# Patient Record
Sex: MALE | Race: WHITE | NOT HISPANIC OR LATINO | Employment: OTHER | URBAN - METROPOLITAN AREA
[De-identification: names, ages, dates, MRNs, and addresses within clinical notes are randomized per-mention and may not be internally consistent; named-entity substitution may affect disease eponyms.]

---

## 2021-07-01 ENCOUNTER — NURSE TRIAGE (OUTPATIENT)
Dept: HEALTH INFORMATION MANAGEMENT | Facility: OTHER | Age: 71
End: 2021-07-01

## 2021-07-01 NOTE — TELEPHONE ENCOUNTER
[address] [place] [address]    CHUY Wellington    Pt is in [place].    Pt has questions in regards to Covid/isolation times. Pt was informed of information on the CDC website, was not given advise d/t not having a [place] liscence, was able to answer questions in reference to CDC.gov.

## 2023-02-06 ENCOUNTER — HOSPITAL ENCOUNTER (OUTPATIENT)
Facility: MEDICAL CENTER | Age: 73
End: 2023-02-07
Attending: HOSPITALIST | Admitting: HOSPITALIST
Payer: MEDICARE

## 2023-02-06 ENCOUNTER — APPOINTMENT (OUTPATIENT)
Dept: CARDIOLOGY | Facility: MEDICAL CENTER | Age: 73
End: 2023-02-06
Attending: INTERNAL MEDICINE
Payer: MEDICARE

## 2023-02-06 DIAGNOSIS — J44.1 COPD EXACERBATION (HCC): ICD-10-CM

## 2023-02-06 PROBLEM — R07.9 CHEST PAIN: Status: ACTIVE | Noted: 2023-02-06

## 2023-02-06 PROBLEM — E78.5 HLD (HYPERLIPIDEMIA): Status: ACTIVE | Noted: 2023-02-06

## 2023-02-06 PROBLEM — I16.0 HYPERTENSIVE URGENCY: Status: ACTIVE | Noted: 2023-02-06

## 2023-02-06 LAB
EKG IMPRESSION: NORMAL
EKG IMPRESSION: NORMAL
EST. AVERAGE GLUCOSE BLD GHB EST-MCNC: 97 MG/DL
HBA1C MFR BLD: 5 % (ref 4–5.6)
LV EJECT FRACT  99904: 60
LV EJECT FRACT MOD 2C 99903: 55.91
LV EJECT FRACT MOD 4C 99902: 60.97
LV EJECT FRACT MOD BP 99901: 55.92
TROPONIN T SERPL-MCNC: 17 NG/L (ref 6–19)
TROPONIN T SERPL-MCNC: 21 NG/L (ref 6–19)
TROPONIN T SERPL-MCNC: 24 NG/L (ref 6–19)
TSH SERPL DL<=0.005 MIU/L-ACNC: 0.95 UIU/ML (ref 0.38–5.33)

## 2023-02-06 PROCEDURE — 83036 HEMOGLOBIN GLYCOSYLATED A1C: CPT

## 2023-02-06 PROCEDURE — 93005 ELECTROCARDIOGRAM TRACING: CPT | Performed by: INTERNAL MEDICINE

## 2023-02-06 PROCEDURE — 99223 1ST HOSP IP/OBS HIGH 75: CPT | Performed by: INTERNAL MEDICINE

## 2023-02-06 PROCEDURE — 700111 HCHG RX REV CODE 636 W/ 250 OVERRIDE (IP): Performed by: INTERNAL MEDICINE

## 2023-02-06 PROCEDURE — G0378 HOSPITAL OBSERVATION PER HR: HCPCS

## 2023-02-06 PROCEDURE — A9270 NON-COVERED ITEM OR SERVICE: HCPCS | Performed by: INTERNAL MEDICINE

## 2023-02-06 PROCEDURE — 94664 DEMO&/EVAL PT USE INHALER: CPT

## 2023-02-06 PROCEDURE — 93306 TTE W/DOPPLER COMPLETE: CPT

## 2023-02-06 PROCEDURE — 96372 THER/PROPH/DIAG INJ SC/IM: CPT

## 2023-02-06 PROCEDURE — 94669 MECHANICAL CHEST WALL OSCILL: CPT

## 2023-02-06 PROCEDURE — 700102 HCHG RX REV CODE 250 W/ 637 OVERRIDE(OP): Performed by: INTERNAL MEDICINE

## 2023-02-06 PROCEDURE — 94640 AIRWAY INHALATION TREATMENT: CPT

## 2023-02-06 PROCEDURE — 84443 ASSAY THYROID STIM HORMONE: CPT

## 2023-02-06 PROCEDURE — 93306 TTE W/DOPPLER COMPLETE: CPT | Mod: 26 | Performed by: INTERNAL MEDICINE

## 2023-02-06 PROCEDURE — 700101 HCHG RX REV CODE 250: Performed by: INTERNAL MEDICINE

## 2023-02-06 PROCEDURE — 84484 ASSAY OF TROPONIN QUANT: CPT

## 2023-02-06 PROCEDURE — 93010 ELECTROCARDIOGRAM REPORT: CPT | Performed by: INTERNAL MEDICINE

## 2023-02-06 RX ORDER — ENOXAPARIN SODIUM 100 MG/ML
40 INJECTION SUBCUTANEOUS DAILY
Status: DISCONTINUED | OUTPATIENT
Start: 2023-02-06 | End: 2023-02-07 | Stop reason: HOSPADM

## 2023-02-06 RX ORDER — BUDESONIDE AND FORMOTEROL FUMARATE DIHYDRATE 160; 4.5 UG/1; UG/1
2 AEROSOL RESPIRATORY (INHALATION)
Status: DISCONTINUED | OUTPATIENT
Start: 2023-02-06 | End: 2023-02-07 | Stop reason: HOSPADM

## 2023-02-06 RX ORDER — IPRATROPIUM BROMIDE AND ALBUTEROL SULFATE 2.5; .5 MG/3ML; MG/3ML
3 SOLUTION RESPIRATORY (INHALATION)
Status: DISCONTINUED | OUTPATIENT
Start: 2023-02-06 | End: 2023-02-07 | Stop reason: HOSPADM

## 2023-02-06 RX ORDER — ACETAMINOPHEN 325 MG/1
650 TABLET ORAL EVERY 6 HOURS PRN
Status: DISCONTINUED | OUTPATIENT
Start: 2023-02-06 | End: 2023-02-07 | Stop reason: HOSPADM

## 2023-02-06 RX ORDER — AMOXICILLIN 250 MG
2 CAPSULE ORAL 2 TIMES DAILY
Status: DISCONTINUED | OUTPATIENT
Start: 2023-02-06 | End: 2023-02-07 | Stop reason: HOSPADM

## 2023-02-06 RX ORDER — ONDANSETRON 2 MG/ML
4 INJECTION INTRAMUSCULAR; INTRAVENOUS EVERY 4 HOURS PRN
Status: DISCONTINUED | OUTPATIENT
Start: 2023-02-06 | End: 2023-02-07 | Stop reason: HOSPADM

## 2023-02-06 RX ORDER — FLUTICASONE FUROATE AND VILANTEROL 200; 25 UG/1; UG/1
1 POWDER RESPIRATORY (INHALATION) DAILY
COMMUNITY

## 2023-02-06 RX ORDER — HYDRALAZINE HYDROCHLORIDE 20 MG/ML
10 INJECTION INTRAMUSCULAR; INTRAVENOUS EVERY 4 HOURS PRN
Status: DISCONTINUED | OUTPATIENT
Start: 2023-02-06 | End: 2023-02-07 | Stop reason: HOSPADM

## 2023-02-06 RX ORDER — NITROGLYCERIN 0.4 MG/1
0.4 TABLET SUBLINGUAL
Status: DISCONTINUED | OUTPATIENT
Start: 2023-02-06 | End: 2023-02-07 | Stop reason: HOSPADM

## 2023-02-06 RX ORDER — PREDNISONE 20 MG/1
40 TABLET ORAL DAILY
Status: DISCONTINUED | OUTPATIENT
Start: 2023-02-06 | End: 2023-02-07 | Stop reason: HOSPADM

## 2023-02-06 RX ORDER — ATORVASTATIN CALCIUM 40 MG/1
40 TABLET, FILM COATED ORAL NIGHTLY
Status: ON HOLD | COMMUNITY
End: 2023-02-07

## 2023-02-06 RX ORDER — POLYETHYLENE GLYCOL 3350 17 G/17G
1 POWDER, FOR SOLUTION ORAL
Status: DISCONTINUED | OUTPATIENT
Start: 2023-02-06 | End: 2023-02-07 | Stop reason: HOSPADM

## 2023-02-06 RX ORDER — BISACODYL 10 MG
10 SUPPOSITORY, RECTAL RECTAL
Status: DISCONTINUED | OUTPATIENT
Start: 2023-02-06 | End: 2023-02-07 | Stop reason: HOSPADM

## 2023-02-06 RX ORDER — ONDANSETRON 4 MG/1
4 TABLET, ORALLY DISINTEGRATING ORAL EVERY 4 HOURS PRN
Status: DISCONTINUED | OUTPATIENT
Start: 2023-02-06 | End: 2023-02-07 | Stop reason: HOSPADM

## 2023-02-06 RX ADMIN — ENOXAPARIN SODIUM 40 MG: 100 INJECTION SUBCUTANEOUS at 18:37

## 2023-02-06 RX ADMIN — IPRATROPIUM BROMIDE AND ALBUTEROL SULFATE 3 ML: .5; 2.5 SOLUTION RESPIRATORY (INHALATION) at 22:08

## 2023-02-06 RX ADMIN — BUDESONIDE AND FORMOTEROL FUMARATE DIHYDRATE 2 PUFF: 160; 4.5 AEROSOL RESPIRATORY (INHALATION) at 12:27

## 2023-02-06 RX ADMIN — IPRATROPIUM BROMIDE AND ALBUTEROL SULFATE 3 ML: .5; 2.5 SOLUTION RESPIRATORY (INHALATION) at 17:10

## 2023-02-06 RX ADMIN — BUDESONIDE AND FORMOTEROL FUMARATE DIHYDRATE 2 PUFF: 160; 4.5 AEROSOL RESPIRATORY (INHALATION) at 18:53

## 2023-02-06 RX ADMIN — PREDNISONE 40 MG: 20 TABLET ORAL at 16:28

## 2023-02-06 ASSESSMENT — LIFESTYLE VARIABLES
ALCOHOL_USE: YES
TOTAL SCORE: 0
TOTAL SCORE: 0
HAVE PEOPLE ANNOYED YOU BY CRITICIZING YOUR DRINKING: NO
EVER_SMOKED: YES
HAVE PEOPLE ANNOYED YOU BY CRITICIZING YOUR DRINKING: NO
ALCOHOL_USE: YES
DOES PATIENT WANT TO STOP DRINKING: NO
TOTAL SCORE: 0
DOES PATIENT WANT TO STOP DRINKING: NO
EVER FELT BAD OR GUILTY ABOUT YOUR DRINKING: NO
TOTAL SCORE: 0
HAVE YOU EVER FELT YOU SHOULD CUT DOWN ON YOUR DRINKING: NO
ON A TYPICAL DAY WHEN YOU DRINK ALCOHOL HOW MANY DRINKS DO YOU HAVE: 3
EVER HAD A DRINK FIRST THING IN THE MORNING TO STEADY YOUR NERVES TO GET RID OF A HANGOVER: NO
HAVE YOU EVER FELT YOU SHOULD CUT DOWN ON YOUR DRINKING: NO
TOTAL SCORE: 0
AVERAGE NUMBER OF DAYS PER WEEK YOU HAVE A DRINK CONTAINING ALCOHOL: 20
EVER FELT BAD OR GUILTY ABOUT YOUR DRINKING: NO
TOTAL SCORE: 0
EVER HAD A DRINK FIRST THING IN THE MORNING TO STEADY YOUR NERVES TO GET RID OF A HANGOVER: NO
CONSUMPTION TOTAL: POSITIVE
HOW MANY TIMES IN THE PAST YEAR HAVE YOU HAD 5 OR MORE DRINKS IN A DAY: 100
CONSUMPTION TOTAL: INCOMPLETE

## 2023-02-06 ASSESSMENT — ENCOUNTER SYMPTOMS
SPUTUM PRODUCTION: 0
BLOOD IN STOOL: 0
DIAPHORESIS: 0
SHORTNESS OF BREATH: 1
BLURRED VISION: 0
COUGH: 0
BRUISES/BLEEDS EASILY: 0
NAUSEA: 0
CHILLS: 0
DIZZINESS: 0
ABDOMINAL PAIN: 0
FLANK PAIN: 0
HEADACHES: 0
PALPITATIONS: 0
SEIZURES: 0
DIARRHEA: 0
BACK PAIN: 0
WHEEZING: 0
NECK PAIN: 0
FEVER: 0
VOMITING: 0
FOCAL WEAKNESS: 0
SORE THROAT: 0
MYALGIAS: 0

## 2023-02-06 ASSESSMENT — PATIENT HEALTH QUESTIONNAIRE - PHQ9
SUM OF ALL RESPONSES TO PHQ9 QUESTIONS 1 AND 2: 0
2. FEELING DOWN, DEPRESSED, IRRITABLE, OR HOPELESS: NOT AT ALL
1. LITTLE INTEREST OR PLEASURE IN DOING THINGS: NOT AT ALL

## 2023-02-06 NOTE — PROGRESS NOTES
Pt arrived via [facility] to [facility]. Pt in NAD. States a 0 out of 10 pain. Pts concerns were addressed and was educated about tests that will be done. Pts daughter is at bedside. Will continue to update.

## 2023-02-06 NOTE — ASSESSMENT & PLAN NOTE
-Troponin 24-->21-->17.    -EKG negative for ischemia.    -No events on cardiac monitoring.    -ECHO showed stress test displayed normal LV systolic function, EF estimated at 60%, no significant valvular abnormalities, and RV systolic pressure estimated to be 30 mmHg.    -Stress test showed ____.    -Lipid panel unremarkable.

## 2023-02-06 NOTE — PROGRESS NOTES
[facility] OFFICER ADMISSION ACCEPTANCE NOTE:     - I spoke and discussed the case with the ER physician, Dr. Noel   - This is a 72-year-old who came to the hospital at [facility] for chest pain.  He states that he has chest pain on exertion, shortness of breath on exertion and lightheadedness.  Troponin and EKG are both negative for ischemia.  He does have a right bundle branch block on EKG.  Outlying facility does not have capabilities for cardiac echo or stress test.  - Please call admitting physician for full admission orders, and cross-coverage issues on patient's arrival to the unit.

## 2023-02-06 NOTE — ASSESSMENT & PLAN NOTE
-CXR negative.  -Influenza A/B negative.    -Placed on steroids and DuoNebs.    -RT protocol and supplemental oxygen.

## 2023-02-06 NOTE — H&P
Hospital Medicine History & Physical Note    Date of Service  2/6/2023    Primary Care Physician  No primary care provider on file.    Consultants  None    Code Status  Full code    Chief Complaint  Dyspnea on exertion    History of Presenting Illness  Akbar Durbin is a 72 y.o. male past medical history of COPD, hyperlipidemia, hypertension who presented 2/6/2023 with dyspnea on exertion.  States that over the past week he has noted progressively worsening dyspnea on exertion.  He does report some upper chest and neck pain with no radiation.  He denies any fevers, chills, cough, nausea or diaphoresis.  Patient states he has quit smoking 3 years ago.  He also reports he stopped taking his lisinopril for hypertension.  He denies any illicit drug use.  He denies any premature history of MI in his family.  His blood pressure on arrival was elevated at 185/87.      He presented to an outlying facility.  I reviewed medical records from the transferring facility which are summarized as follows:  WBC 5.9, hemoglobin 14.2, hematocrit 43.3, platelets 206  Sodium 144, potassium 4.2, chloride 108, bicarb 28.2, anion gap 7.8, glucose 95, BUN 15, creatinine 1.03, calcium 8.8, AST 28, albumin 4.1, total bili 1.0, alk phos 83, ALT 25  Troponin 10,   Viral respiratory panel negative  CTA chest with IV contrast negative for PE with clear lungs  EKG interpreted by me reveals sinus rhythm with right bundle branch block.  No ST elevation noted    I discussed the plan of care with patient.    Review of Systems  Review of Systems   Constitutional:  Negative for chills, diaphoresis and fever.   HENT:  Negative for hearing loss and sore throat.    Eyes:  Negative for blurred vision.   Respiratory:  Positive for shortness of breath. Negative for cough, sputum production and wheezing.    Cardiovascular:  Positive for chest pain. Negative for palpitations and leg swelling.   Gastrointestinal:  Negative for abdominal pain, blood  in stool, diarrhea, nausea and vomiting.   Genitourinary:  Negative for dysuria, flank pain and urgency.   Musculoskeletal:  Negative for back pain, joint pain, myalgias and neck pain.   Skin:  Negative for rash.   Neurological:  Negative for dizziness, focal weakness, seizures and headaches.   Endo/Heme/Allergies:  Does not bruise/bleed easily.   Psychiatric/Behavioral:  Negative for suicidal ideas.    All other systems reviewed and are negative.    Past Medical History  COPD, hyperlipidemia, hypertension    Surgical History  No pertinent surgical history    Family History     Family history reviewed with patient. There is no family history that is pertinent to the chief complaint.     Social History   Ex-smoker, quit smoking 3 years ago    Allergies  nkda    Medications  Breo inhaler  Atorvastatin       Physical Exam  Temp:  [36.1 °C (96.9 °F)] (P) 36.1 °C (96.9 °F)  Pulse:  [69] (P) 69  Resp:  [18] (P) 18  BP: (P) 185/87  SpO2:  [90 %] (P) 90 %      Physical Exam  Vitals and nursing note reviewed.   Constitutional:       General: He is not in acute distress.     Appearance: Normal appearance.   HENT:      Head: Normocephalic and atraumatic.      Nose: Nose normal.      Mouth/Throat:      Mouth: Mucous membranes are moist.   Eyes:      Extraocular Movements: Extraocular movements intact.      Conjunctiva/sclera: Conjunctivae normal.      Pupils: Pupils are equal, round, and reactive to light.   Cardiovascular:      Rate and Rhythm: Normal rate and regular rhythm.      Pulses: Normal pulses.      Heart sounds: Normal heart sounds.   Pulmonary:      Effort: No respiratory distress.      Breath sounds: Wheezing present. No rhonchi or rales.      Comments: Diminished breath sounds with end expiratory wheezing diffusely  Abdominal:      General: Bowel sounds are normal. There is no distension.      Palpations: Abdomen is soft.      Tenderness: There is no abdominal tenderness.   Musculoskeletal:         General: No  swelling or tenderness. Normal range of motion.      Cervical back: Normal range of motion and neck supple.   Lymphadenopathy:      Cervical: No cervical adenopathy.   Skin:     General: Skin is warm.      Coloration: Skin is not jaundiced.      Findings: No rash.   Neurological:      General: No focal deficit present.      Mental Status: He is alert and oriented to person, place, and time.      Cranial Nerves: No cranial nerve deficit.      Motor: No weakness.   Psychiatric:         Mood and Affect: Mood normal.         Behavior: Behavior normal.       Laboratory:          No results for input(s): ALTSGPT, ASTSGOT, ALKPHOSPHAT, TBILIRUBIN, DBILIRUBIN, GAMMAGT, AMYLASE, LIPASE, ALB, PREALBUMIN, GLUCOSE in the last 72 hours.      No results for input(s): NTPROBNP in the last 72 hours.      No results for input(s): TROPONINT in the last 72 hours.    Imaging:  No orders to display           Assessment/Plan:  Justification for Admission Status  I anticipate this patient is appropriate for observation status at this time because ACS r/o and HTN urgency    Patient will need a Telemetry bed on CARDIOLOGY service .    * Chest pain- (present on admission)  Assessment & Plan  Rule out ACS  Initial EKG and troponin negative for ischemia  Continuous cardiac monitoring with serial EKG and troponin  Nuclear medicine cardiac stress test in the morning if troponin remains negative  Patient has been given full dose of aspirin  Check lipid panel, TSH and hemoglobin A1c  Nitro when necessary for chest pain        Hypertensive urgency- (present on admission)  Assessment & Plan  Started on losartan  IV hydralazine as needed for SBP greater than 160    COPD exacerbation (HCC)- (present on admission)  Assessment & Plan  Patient has been started on steroids, scheduled DuoNeb nebulized breathing treatment,  RT protocol and supplemental oxygen  No pneumonia on imaging  Rapid influenza negative      HLD (hyperlipidemia)- (present on  admission)  Assessment & Plan  Continue atorvastatin         VTE prophylaxis: enoxaparin ppx    I spent a total of 35 minutes of non face to face time performing additional research, reviewing medical records from transferring facility, discussing plan of care with other healthcare providers. Start time: 11:00 am. End time: 11 35 am

## 2023-02-07 ENCOUNTER — APPOINTMENT (OUTPATIENT)
Dept: RADIOLOGY | Facility: MEDICAL CENTER | Age: 73
End: 2023-02-07
Attending: INTERNAL MEDICINE
Payer: MEDICARE

## 2023-02-07 VITALS
RESPIRATION RATE: 18 BRPM | TEMPERATURE: 97 F | WEIGHT: 224.87 LBS | SYSTOLIC BLOOD PRESSURE: 135 MMHG | OXYGEN SATURATION: 94 % | BODY MASS INDEX: 32.19 KG/M2 | HEIGHT: 70 IN | DIASTOLIC BLOOD PRESSURE: 61 MMHG | HEART RATE: 79 BPM

## 2023-02-07 PROBLEM — I16.0 HYPERTENSIVE URGENCY: Status: RESOLVED | Noted: 2023-02-06 | Resolved: 2023-02-07

## 2023-02-07 PROBLEM — E66.9 CLASS 1 OBESITY IN ADULT: Status: ACTIVE | Noted: 2023-02-07

## 2023-02-07 LAB
ANION GAP SERPL CALC-SCNC: 8 MMOL/L (ref 7–16)
BASOPHILS # BLD AUTO: 0.3 % (ref 0–1.8)
BASOPHILS # BLD: 0.02 K/UL (ref 0–0.12)
BUN SERPL-MCNC: 16 MG/DL (ref 8–22)
CALCIUM SERPL-MCNC: 9.3 MG/DL (ref 8.5–10.5)
CHLORIDE SERPL-SCNC: 103 MMOL/L (ref 96–112)
CHOLEST SERPL-MCNC: 120 MG/DL (ref 100–199)
CO2 SERPL-SCNC: 27 MMOL/L (ref 20–33)
CREAT SERPL-MCNC: 0.71 MG/DL (ref 0.5–1.4)
EOSINOPHIL # BLD AUTO: 0 K/UL (ref 0–0.51)
EOSINOPHIL NFR BLD: 0 % (ref 0–6.9)
ERYTHROCYTE [DISTWIDTH] IN BLOOD BY AUTOMATED COUNT: 44.8 FL (ref 35.9–50)
GFR SERPLBLD CREATININE-BSD FMLA CKD-EPI: 96 ML/MIN/1.73 M 2
GLUCOSE SERPL-MCNC: 289 MG/DL (ref 65–99)
HCT VFR BLD AUTO: 39.9 % (ref 42–52)
HDLC SERPL-MCNC: 73 MG/DL
HGB BLD-MCNC: 13.4 G/DL (ref 14–18)
IMM GRANULOCYTES # BLD AUTO: 0.03 K/UL (ref 0–0.11)
IMM GRANULOCYTES NFR BLD AUTO: 0.4 % (ref 0–0.9)
LDLC SERPL CALC-MCNC: 40 MG/DL
LYMPHOCYTES # BLD AUTO: 0.26 K/UL (ref 1–4.8)
LYMPHOCYTES NFR BLD: 3.4 % (ref 22–41)
MCH RBC QN AUTO: 30.8 PG (ref 27–33)
MCHC RBC AUTO-ENTMCNC: 33.6 G/DL (ref 33.7–35.3)
MCV RBC AUTO: 91.7 FL (ref 81.4–97.8)
MONOCYTES # BLD AUTO: 0.12 K/UL (ref 0–0.85)
MONOCYTES NFR BLD AUTO: 1.6 % (ref 0–13.4)
NEUTROPHILS # BLD AUTO: 7.25 K/UL (ref 1.82–7.42)
NEUTROPHILS NFR BLD: 94.3 % (ref 44–72)
NRBC # BLD AUTO: 0 K/UL
NRBC BLD-RTO: 0 /100 WBC
NT-PROBNP SERPL IA-MCNC: 103 PG/ML (ref 0–125)
PLATELET # BLD AUTO: 181 K/UL (ref 164–446)
PMV BLD AUTO: 9.8 FL (ref 9–12.9)
POTASSIUM SERPL-SCNC: 3.9 MMOL/L (ref 3.6–5.5)
RBC # BLD AUTO: 4.35 M/UL (ref 4.7–6.1)
SODIUM SERPL-SCNC: 138 MMOL/L (ref 135–145)
TRIGL SERPL-MCNC: 35 MG/DL (ref 0–149)
WBC # BLD AUTO: 7.7 K/UL (ref 4.8–10.8)

## 2023-02-07 PROCEDURE — 99239 HOSP IP/OBS DSCHRG MGMT >30: CPT | Performed by: NURSE PRACTITIONER

## 2023-02-07 PROCEDURE — 85025 COMPLETE CBC W/AUTO DIFF WBC: CPT

## 2023-02-07 PROCEDURE — 80061 LIPID PANEL: CPT

## 2023-02-07 PROCEDURE — 83880 ASSAY OF NATRIURETIC PEPTIDE: CPT

## 2023-02-07 PROCEDURE — 94640 AIRWAY INHALATION TREATMENT: CPT

## 2023-02-07 PROCEDURE — 700111 HCHG RX REV CODE 636 W/ 250 OVERRIDE (IP): Performed by: INTERNAL MEDICINE

## 2023-02-07 PROCEDURE — 93005 ELECTROCARDIOGRAM TRACING: CPT | Mod: XE | Performed by: INTERNAL MEDICINE

## 2023-02-07 PROCEDURE — 94669 MECHANICAL CHEST WALL OSCILL: CPT

## 2023-02-07 PROCEDURE — 700102 HCHG RX REV CODE 250 W/ 637 OVERRIDE(OP): Performed by: INTERNAL MEDICINE

## 2023-02-07 PROCEDURE — 700111 HCHG RX REV CODE 636 W/ 250 OVERRIDE (IP)

## 2023-02-07 PROCEDURE — A9270 NON-COVERED ITEM OR SERVICE: HCPCS | Performed by: INTERNAL MEDICINE

## 2023-02-07 PROCEDURE — 78452 HT MUSCLE IMAGE SPECT MULT: CPT

## 2023-02-07 PROCEDURE — 700101 HCHG RX REV CODE 250: Performed by: INTERNAL MEDICINE

## 2023-02-07 PROCEDURE — G0378 HOSPITAL OBSERVATION PER HR: HCPCS

## 2023-02-07 PROCEDURE — 71045 X-RAY EXAM CHEST 1 VIEW: CPT

## 2023-02-07 PROCEDURE — 80048 BASIC METABOLIC PNL TOTAL CA: CPT

## 2023-02-07 RX ORDER — ALBUTEROL SULFATE 90 UG/1
2 AEROSOL, METERED RESPIRATORY (INHALATION) EVERY 6 HOURS PRN
Qty: 8.5 G | Refills: 0 | Status: SHIPPED | OUTPATIENT
Start: 2023-02-07

## 2023-02-07 RX ORDER — REGADENOSON 0.08 MG/ML
0.4 INJECTION, SOLUTION INTRAVENOUS ONCE
Status: COMPLETED | OUTPATIENT
Start: 2023-02-07 | End: 2023-02-07

## 2023-02-07 RX ORDER — PREDNISONE 20 MG/1
40 TABLET ORAL DAILY
Qty: 6 TABLET | Refills: 0 | Status: SHIPPED | OUTPATIENT
Start: 2023-02-07 | End: 2023-02-10

## 2023-02-07 RX ORDER — REGADENOSON 0.08 MG/ML
INJECTION, SOLUTION INTRAVENOUS
Status: COMPLETED
Start: 2023-02-07 | End: 2023-02-07

## 2023-02-07 RX ORDER — AMINOPHYLLINE 25 MG/ML
100 INJECTION, SOLUTION INTRAVENOUS
Status: DISCONTINUED | OUTPATIENT
Start: 2023-02-07 | End: 2023-02-07 | Stop reason: HOSPADM

## 2023-02-07 RX ADMIN — REGADENOSON 0.4 MG: 0.08 INJECTION, SOLUTION INTRAVENOUS at 08:56

## 2023-02-07 RX ADMIN — PREDNISONE 40 MG: 20 TABLET ORAL at 06:04

## 2023-02-07 RX ADMIN — IPRATROPIUM BROMIDE AND ALBUTEROL SULFATE 3 ML: .5; 2.5 SOLUTION RESPIRATORY (INHALATION) at 06:53

## 2023-02-07 RX ADMIN — IPRATROPIUM BROMIDE AND ALBUTEROL SULFATE 3 ML: .5; 2.5 SOLUTION RESPIRATORY (INHALATION) at 10:40

## 2023-02-07 RX ADMIN — SENNOSIDES AND DOCUSATE SODIUM 2 TABLET: 50; 8.6 TABLET ORAL at 06:04

## 2023-02-07 RX ADMIN — IPRATROPIUM BROMIDE AND ALBUTEROL SULFATE 3 ML: .5; 2.5 SOLUTION RESPIRATORY (INHALATION) at 04:07

## 2023-02-07 RX ADMIN — BUDESONIDE AND FORMOTEROL FUMARATE DIHYDRATE 2 PUFF: 160; 4.5 AEROSOL RESPIRATORY (INHALATION) at 06:54

## 2023-02-07 ASSESSMENT — PATIENT HEALTH QUESTIONNAIRE - PHQ9
2. FEELING DOWN, DEPRESSED, IRRITABLE, OR HOPELESS: NOT AT ALL
SUM OF ALL RESPONSES TO PHQ9 QUESTIONS 1 AND 2: 0
1. LITTLE INTEREST OR PLEASURE IN DOING THINGS: NOT AT ALL

## 2023-02-07 NOTE — PROGRESS NOTES
Assumed care. A/O. Denies CP or dyspnea. Room air sat 88%. Maintained on 2L/nc to keep sats over 90%. Oral care completed. To Nuc Med for stress test via wc.

## 2023-02-07 NOTE — PROGRESS NOTES
Seen by NP. Cleared for DC. All prescriptions discussed with him and his family. [id number] coordinated. IV ANNY'd. DC to [facility] via [facility].

## 2023-02-07 NOTE — PROGRESS NOTES
COPD Auto-consult     I was consulted for COPD admission, patient was discharged before I was able to see him.  I did not do any review of his chart or see the patient.  He was discharged on prednisone for COPD exacerbation.    Lamar Figueroa, DO   Pulmonary and Critical Care

## 2023-02-07 NOTE — CARE PLAN
The patient is Stable - Low risk of patient condition declining or worsening    Shift Goals  Clinical Goals: Stress test, CXR, monitor, DC  Patient Goals: comfort, stress test completion  Family Goals: rest    Progress made toward(s) clinical / shift goals:  DC today. Stress test complete.     Patient is not progressing towards the following goals:

## 2023-02-07 NOTE — DISCHARGE SUMMARY
Discharge Summary    CHIEF COMPLAINT ON ADMISSION  Shortness of breath    Reason for Admission  Chest pain/ACS rule out    Admission Date  2/6/2023    CODE STATUS  Full Code    HPI & HOSPITAL COURSE  Akbar Durbin is a 72 y.o. male with PMH of COPD, hyperlipidemia, and hypertension who was admitted for chest pain/ACS rule out.    Troponin 24-->21-->17.  EKG negative for ischemia.  No events on cardiac monitoring.  ECHO showed stress test displayed normal LV systolic function, EF estimated at 60%, no significant valvular abnormalities, and RV systolic pressure estimated to be 30 mmHg.  Stress test showed no evidence of significant jeopardized viable myocardium or prior myocardial infarct, normal LV size/EF/wall motion.  Lipid panel unremarkable.    CXR unremarkable.  Influenza A/B negative.  Placed on steroids and scheduled DuoNeb for COPD exacerbation.  On room air prior to discharge.       Intermittent hyperglycemia noted which was likely secondary to steroid use.  Hemoglobin A1c 5.0.    Discharged with prescriptions for daily prednisone 40 mg x 3 days and PRN Albuterol.      Therefore, he is discharged in good and stable condition to home with close outpatient follow-up.    The patient recovered much more quickly than anticipated on admission.    I have performed a physical exam and reviewed and updated ROS and Plan today (2/6/2023). In review of yesterday's note (2/7/2023), there are no changes except as documented above.    Discharge Date  2/7/2023    FOLLOW UP ITEMS POST DISCHARGE  -Follow-up with PCP to discuss continuation of Atorvastatin.    DISCHARGE DIAGNOSES  Principal Problem:    Chest pain POA: Yes  Active Problems:    COPD exacerbation (HCC) POA: Yes    HLD (hyperlipidemia) POA: Yes    Class 1 obesity in adult POA: Unknown  Resolved Problems:    Hypertensive urgency POA: Yes    FOLLOW UP  No future appointments.  Primary Care Provider    Schedule an appointment as soon as possible for a  visit  in the next 1-2 weeks.    MEDICATIONS ON DISCHARGE     Medication List        START taking these medications        Instructions   albuterol 108 (90 Base) MCG/ACT Aers inhalation aerosol   Inhale 2 Puffs every 6 hours as needed for Shortness of Breath.  Dose: 2 Puff     predniSONE 20 MG Tabs  Commonly known as: DELTASONE   Take 2 Tablets by mouth every day for 3 days.  Dose: 40 mg            CONTINUE taking these medications        Instructions   Breo Ellipta 200-25 MCG/ACT Aepb  Generic drug: fluticasone furoate-vilanterol   Inhale 1 Puff every day.  Dose: 1 Puff            STOP taking these medications      atorvastatin 40 MG Tabs  Commonly known as: LIPITOR            Allergies  No Known Allergies    DIET  Orders Placed This Encounter   Procedures    Diet Order Diet: Cardiac; Miscellaneous modifications: (optional): No Decaf, No Caffeine(for test)     Standing Status:   Standing     Number of Occurrences:   1     Order Specific Question:   Diet:     Answer:   Cardiac [6]     Order Specific Question:   Miscellaneous modifications: (optional)     Answer:   No Decaf, No Caffeine(for test) [11]     ACTIVITY  As tolerated.  Weight bearing as tolerated    CONSULTATIONS  None    PROCEDURES  -Stress test    LABORATORY  Lab Results   Component Value Date    SODIUM 138 02/07/2023    POTASSIUM 3.9 02/07/2023    CHLORIDE 103 02/07/2023    CO2 27 02/07/2023    GLUCOSE 289 (H) 02/07/2023    BUN 16 02/07/2023    CREATININE 0.71 02/07/2023      Lab Results   Component Value Date    WBC 7.7 02/07/2023    HEMOGLOBIN 13.4 (L) 02/07/2023    HEMATOCRIT 39.9 (L) 02/07/2023    PLATELETCT 181 02/07/2023      Total time of the discharge process took 34 minutes.

## 2023-02-07 NOTE — HOSPITAL COURSE
Akbar Durbin is a 72 y.o. male with PMH of COPD, hyperlipidemia, and hypertension who was admitted for chest pain/ACS rule out.    Troponin 24-->21-->17.  EKG negative for ischemia.  No events on cardiac monitoring.  ECHO showed stress test displayed normal LV systolic function, EF estimated at 60%, no significant valvular abnormalities, and RV systolic pressure estimated to be 30 mmHg.  Stress test showed no evidence of significant jeopardized viable myocardium or prior myocardial infarct, normal LV size/EF/wall motion.  Lipid panel unremarkable.    CXR unremarkable.  Influenza A/B negative.  Placed on steroids and scheduled DuoNeb for COPD exacerbation.  On room air prior to discharge.       Intermittent hyperglycemia noted which was likely secondary to steroid use.  Hemoglobin A1c 5.0.    Discharged with prescriptions for daily prednisone 40 mg x 3 days and PRN Albuterol.

## 2023-02-07 NOTE — RESPIRATORY CARE
COPD EDUCATION by COPD CLINICAL EDUCATOR  2/6/2023  at  4:56 PM by Kasia Lam RRT     Patient interviewed by COPD education team.  Patient unable to participate in full program.  A short intervention has been conducted.  A comprehensive packet including information about COPD, types of treatments to manage their disease and safe home Oxygen usage was provided and reviewed with patient at the bedside. Reviewed what is COPD?, medication, pulmonary function testing, oxygen safety and action plan. Also, included information on pulmonary rehab. Patient would like to be in our COPD program, but [place] out of state.       COPD Assessment  COPD Clinical Specialists ONLY  COPD Education Initiated: Yes--Short Intervention  DME Equipment Type: cpap w/ oxygen 2L  Physician Name: [place] doctor  Pulmonologist Name: DR Tineo -[place]  Referrals Initiated: Yes  Pulmonary Rehab: Yes (was placed on admit, pt not able to go do [place], recommended him talking to his pulmonolgist)  Smoking Cessation: N/A  Palliative Care:  (not ordered)  Hospice: N/A  Home Health Care: N/A  [facility] Outreach: N/A  [facility]: N/A  [facility]  Private In-Home Care Agency: N/A  Is this a COPD exacerbation patient?: Yes (O.S used, per H&P, IP notified)  $ Demo/Eval of SVN's, MDI's and Aerosols: Yes (spacer)  (OP) Pulmonary Function Testing:  (2021 in [place], Mild COPD per patient, no data found)    PFT Results    No results found for: PFT    Meds to Beds  Would the patient like to opt in for Bedside Medication Delivery at Discharge?: Yes, interested     MY COPD ACTION PLAN     It is recommended that patients and physicians /healthcare providers complete this action plan together. This plan should be discussed at each physician visit and updated as needed.    The green, yellow and red zones show groups of symptoms of COPD. This list of symptoms is not comprehensive, and you may experience other symptoms. In the  "\"Actions\" column, your healthcare provider has recommended actions for you to take based on your symptoms.    Patient Name: Akbar Durbin   YOB: 1950   Last Updated on: 2/6/2023  4:56 PM   Green Zone:  I am doing well today Actions     Usual activitiy and exercise level   Take daily medications     Usual amounts of cough and phlegm/mucus   Use oxygen as prescribed     Sleep well at night   Continue regular exercise/diet plan     Appetite is good   At all times avoid cigarette smoke, inhaled irritants     Daily Medications (these medications are taken every day):   Fluticasone Furoate and Vilanterol trifenatate (Breo) 1 Puff Once daily     Additional Information:  Rinse and spit after each treatment     Yellow Zone:  I am having a bad day or a COPD flare Actions     More breathless than usual   Continue daily medications     I have less energy for my daily activities   Use quick relief inhaler as ordered     Increased or thicker phlegm/mucus   Use oxygen as prescribed     Using quick relief inhaler/nebulizer more often   Get plenty of rest     Swelling of ankles more than usual   Use pursed lip breathing     More coughing than usual   At all times avoid cigarette smoke, inhaled irritants     I feel like I have a \"chest cold\"     Poor sleep and my symptoms woke me up     My appetite is not good     My medicine is not helping      Call provider immediately if symptoms don’t improve     Continue daily medications, add rescue medications:   Albuterol 2 Puffs Every 4 hours PRN       Medications to be used during a flare up, (as Discussed with Provider):           Additional Information:  Use spacer with each treatment     Red Zone:  I need urgent medical care Actions     Severe shortness of breath even at rest   Call 911 or seek medical care immediately     Not able to do any activity because of breathing      Fever or shaking chills      Feeling confused or very drowsy       Chest pains      " Coughing up blood

## 2023-02-07 NOTE — PROGRESS NOTES
4 Eyes Skin Assessment Completed by CHHAYA Mejia and CHHAYA Juárez.    Head WDL  Ears WDL  Nose WDL  Mouth WDL  Neck WDL  Breast/Chest WDL  Shoulder Blades WDL  Spine WDL  (R) Arm/Elbow/Hand WDL  (L) Arm/Elbow/Hand WDL  Abdomen WDL  Groin WDL  Scrotum/Coccyx/Buttocks WDL  (R) Leg WDL  (L) Leg WDL  (R) Heel/Foot/Toe WDL  (L) Heel/Foot/Toe WDL          Devices In Places Tele Box and Nasal Cannula      Interventions In Place Gray Ear Foams    Possible Skin Injury No    Pictures Uploaded Into Epic N/A  Wound Consult Placed N/A  RN Wound Prevention Protocol Ordered No

## 2023-02-07 NOTE — CARE PLAN
The patient is Watcher - Medium risk of patient condition declining or worsening    Shift Goals  Clinical Goals: tele monitor, stress test  Patient Goals: rest  Family Goals: rest    Progress made toward(s) clinical / shift goals:    Problem: Knowledge Deficit - Standard  Goal: Patient and family/care givers will demonstrate understanding of plan of care, disease process/condition, diagnostic tests and medications  Outcome: Progressing   Expected end date: 2/7/2023    Problem: Knowledge Deficit - COPD  Goal: Patient/significant other demonstrates understanding of disease process, utilization of the Action Plan, medications and discharge instruction  Outcome: Progressing   Expected end date: 2/7/2023     Problem: Risk for Infection - COPD  Goal: Patient will remain free from signs and symptoms of infection  Outcome: Progressing   Expected end date: 2/7/2023       Patient is not progressing towards the following goals:

## 2023-02-07 NOTE — CARE PLAN
The patient is Stable - Low risk of patient condition declining or worsening    Shift Goals  Clinical Goals: comfort  Patient Goals: Comfort, rest    Progress made toward(s) clinical / shift goals:  Pt denies chest pain. Plan for stress test in the AM.     Patient is not progressing towards the following goals:

## 2023-02-08 LAB
EKG IMPRESSION: NORMAL
EKG IMPRESSION: NORMAL

## 2023-02-08 PROCEDURE — 93010 ELECTROCARDIOGRAM REPORT: CPT | Performed by: INTERNAL MEDICINE

## 2023-02-08 PROCEDURE — 93010 ELECTROCARDIOGRAM REPORT: CPT | Mod: 76 | Performed by: INTERNAL MEDICINE

## 2023-02-10 ENCOUNTER — HOSPITAL ENCOUNTER (OUTPATIENT)
Dept: RADIOLOGY | Facility: MEDICAL CENTER | Age: 73
End: 2023-02-10
Payer: MEDICARE